# Patient Record
Sex: MALE | Race: WHITE
[De-identification: names, ages, dates, MRNs, and addresses within clinical notes are randomized per-mention and may not be internally consistent; named-entity substitution may affect disease eponyms.]

---

## 2017-09-10 ENCOUNTER — HOSPITAL ENCOUNTER (EMERGENCY)
Dept: HOSPITAL 80 - FED | Age: 27
Discharge: HOME | End: 2017-09-10
Payer: MEDICAID

## 2017-09-10 VITALS — DIASTOLIC BLOOD PRESSURE: 85 MMHG | RESPIRATION RATE: 14 BRPM | SYSTOLIC BLOOD PRESSURE: 134 MMHG | HEART RATE: 45 BPM

## 2017-09-10 VITALS — OXYGEN SATURATION: 96 % | TEMPERATURE: 98.2 F
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
## 2017-09-10 DIAGNOSIS — S62.101A: Primary | ICD-10-CM
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
## 2017-09-10 DIAGNOSIS — F17.200: ICD-10-CM

## 2017-09-10 NOTE — EDPHY
H & P


Time Seen by Provider: 09/10/17 13:28


HPI/ROS: 





CHIEF COMPLAINT:  Right wrist pain





HISTORY OF PRESENT ILLNESS:  27-year-old right-hand-dominant male with prior 

history of right wrist fracture and sustained fall onto his outstretched right 

hand last evening while skateboarding.  No paresthesia.  No proximal distal 

pain or injury.  He has a pre-hospital removable splint in place from 1 his 

wrist was previously broken 1 year ago.











************


PHYSICAL EXAM





(Prior to examination, patient consented to physical exam, hands were washed 

and my usual and customary physical exam procedures followed)


1) GENERAL: Well-developed, well-nourished, alert and oriented.  Appears to be 

in no acute distress.


2) HEAD: Normocephalic


3) HEENT: Pupils equal, round, reactive to light bilaterally.  


4) LUNGS: Breathing comfortably.   


5) MUSCULOSKELETAL:  Tender to palpation distal radius and anatomic snuffbox.  

Soft compartments.  Normal coloration.


6) SKIN: intact 


7) VASCULAR:  pulses and cap refill present are brisk


8) NEUROLOGIC:  Radial, ulnar, median nerve function intact with no deficits 

appreciated on exam 








***************





DIFFERENTIAL DIAGNOSIS:   in no particular order including but not limited to 

fracture, sprain, compartment syndrome





********











Smoking Status: Current every day smoker


Constitutional: 


 Initial Vital Signs











Temperature (C)  36.8 C   09/10/17 12:47


 


Heart Rate  76   09/10/17 12:47


 


Respiratory Rate  16   09/10/17 12:47


 


Blood Pressure  140/79 H  09/10/17 12:47


 


O2 Sat (%)  96   09/10/17 12:47








 











O2 Delivery Mode               Room Air














Allergies/Adverse Reactions: 


 





No Known Allergies Allergy (Unverified 06/06/16 14:11)


 








Home Medications: 














 Medication  Instructions  Recorded


 


Acetaminophen [Tylenol 325mg (*)] 650 mg PO Q4 PRN #0 tab 09/22/16


 


lamoTRIgine [LaMICtal] 25 mg PO HS #0 tab 09/22/16














MDM/Departure





- MDM


Imaging Results: 


 Imaging Impressions





Wrist X-Ray  09/10/17 12:50


Impression:


1. Small chip or avulsion fractures suspected associated with the dorsal aspect 

of the triquetrum.








Images reviewed by myself


Medications Given: 


 








Discontinued Medications





Lorazepam (Ativan)  1 mg PO EDNOW ONE


   Stop: 09/10/17 14:03


   Last Admin: 09/10/17 14:26 Dose:  Not Given


Olanzapine (Zyprexa Zydis)  10 mg PO EDNOW ONE


   Stop: 09/10/17 14:03


   Last Admin: 09/10/17 14:26 Dose:  Not Given





ED Course/Re-evaluation: 





The patient was re-evaluated with serial examinations, discussed his imaging 

results.  He remains neurovascularly intact no evidence of compartment 

syndrome.  He has his own pre-hospital splint from when he broke his same wrist 

1 year ago.  He has place this on his wrist and I have evaluated the placement 

and this is doing an excellent job of immobilizing the wrist.  Recommend he use 

this splint .  Recommend he follow up with Orthopedics and given this follow-up 

information.  Usual and customary orthopedic precautions and instructions 

provided





- Depart


Disposition: Home, Routine, Self-Care


Clinical Impression: 


Right wrist fracture


Qualifiers:


 Encounter type: initial encounter Fracture type: closed Qualified Code(s): 

S62.101A - Fracture of unspecified carpal bone, right wrist, initial encounter 

for closed fracture





Condition: Good


Instructions:  Wrist Fracture in Adults (ED)


Additional Instructions: 


Return to the ER immediately if you experience discoloration, have worsening 

pain, numbness, tingling, or any other symptoms that concern you.  If you 

received x-rays in the emergency department today, be advised, that ligamentous

, tendon, muscular, and other non-bony injury cannot be fully ruled out. Try to 

keep your affected extremity elevated above the level of your chest, and keep 

cold packs on the affected area, for the next 48 hours.





Adult Pain & Fever Control:


We recommend Acetaminophen (Tylenol) and Ibuprofen (Motrin,Advil) for pain and 

fever control.  When fever is high or pain severe, both drugs can be used at 

the same time, but at different intervals.  Please note the time differences.  


Your dose is:     Acetaminophen 650mg every 4 to 6 hours


        Ibuprofen 600mg every 6 hours with food   OR


          


Note:  do not take Acetaminophen with Hydrocodone (Vicodin, Lortab) or Oycodone 

(Percocet).  These medications also contain Acetaminophen.  No more than 3000mg 

of Acetaminophen should be taken in 24 hours (for an adult).


Referrals: 


Aj Adams MD [Medical Doctor] - 2-3 days, call for appt.

## 2018-03-10 ENCOUNTER — HOSPITAL ENCOUNTER (EMERGENCY)
Dept: HOSPITAL 80 - CED | Age: 28
Discharge: HOME | End: 2018-03-10
Payer: MEDICAID

## 2018-03-10 VITALS — RESPIRATION RATE: 16 BRPM | TEMPERATURE: 98.2 F

## 2018-03-10 VITALS — SYSTOLIC BLOOD PRESSURE: 123 MMHG | HEART RATE: 65 BPM | DIASTOLIC BLOOD PRESSURE: 71 MMHG | OXYGEN SATURATION: 96 %

## 2018-03-10 DIAGNOSIS — F17.200: ICD-10-CM
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
## 2018-03-10 DIAGNOSIS — S62.316A: Primary | ICD-10-CM

## 2018-03-10 PROCEDURE — 2W3EX1Z IMMOBILIZATION OF RIGHT HAND USING SPLINT: ICD-10-PCS

## 2018-03-10 NOTE — EDPHY
H & P


Time Seen by Provider: 03/10/18 11:55


HPI/ROS: 





CHIEF COMPLAINT:  Right hand injury





HISTORY OF PRESENT ILLNESS:  Patient complaining of right hand pain and 

swelling after punching a wall last night.  He states he was intoxicated at the 

time.  He admits he has a problem with alcohol.  He denies other injuries.  He 

has no complaints of numbness or weakness to the right hand.  He is right-hand 

dominant.





Social history includes heavy EtOH use, cannabis use.





Medical history includes traumatic brain injury 2 years ago.  Left shoulder 

surgery.





General appearance:  Awake and alert, not intoxicated.  No distress





Head nontraumatic, cervical spine nontender, normal range of motion.


No respiratory distress.


Right upper extremity with tenderness, swelling, erythema to dorsum of hand 

over the 4th and 5th metacarpals.  No wrist tenderness or swelling.  No 

snuffbox tenderness.  Distal circulation, sensation, strength intact.  No 

rotational deformity to the fingers.





DIFFERENTIAL DIAGNOSIS:  After history and physical exam differential diagnosis 

was considered for boxer's fracture, wrist fracture, other hand fracture, 

lunate or perilunate dislocation.  Proximal 5th metacarpal fracture noted on x-

ray I reviewed the images myself.  Discussed briefly with Dr. Schwarz, orthopedist

, plan is for ulnar gutter splint with follow-up next week for more definitive 

casting as indicated.  Discussed with patient.  I reviewed the splint after 

application and good immobilization in ulnar-gutter splint obtained, distal 

intact post splinting.  Patient understands follow-up, stable for discharge.


Smoking Status: Current every day smoker


Constitutional: 


 Initial Vital Signs











Temperature (C)  36.8 C   03/10/18 11:50


 


Heart Rate  59 L  03/10/18 11:50


 


Respiratory Rate  16   03/10/18 11:50


 


Blood Pressure  142/78 H  03/10/18 11:50


 


O2 Sat (%)  94   03/10/18 11:50








 











O2 Delivery Mode               Room Air














Allergies/Adverse Reactions: 


 





No Known Allergies Allergy (Unverified 03/10/18 11:55)


 








Home Medications: 














 Medication  Instructions  Recorded


 


Acetaminophen [Tylenol 325mg (*)] 650 mg PO Q4 PRN #0 tab 09/22/16














Medical Decision Making





- Data Points


Medications Given: 


 








Discontinued Medications





Ibuprofen (Motrin)  600 mg PO EDNOW ONE


   Stop: 03/10/18 12:08


   Last Admin: 03/10/18 12:17 Dose:  600 mg








Departure





- Departure


Disposition: Home, Routine, Self-Care


Clinical Impression: 


Fx metacarpal


Qualifiers:


 Encounter type: initial encounter Metacarpal bone: fifth Fracture type: closed 

Metacarpal location: base Fracture alignment: displaced Laterality: right 

Qualified Code(s): S62.316A - Displaced fracture of base of fifth metacarpal 

bone, right hand, initial encounter for closed fracture





Instructions:  Hand Fracture (ED)


Additional Instructions: 


Keep splint in place.  Call orthopedist office on Monday morning for follow-up 

appointment.  Ice and elevation as well as ibuprofen and Tylenol for pain.  

Return to the ED for re-evaluation if any significant numbness, discoloration 

or other concerning symptoms develop.


Referrals: 


Kenzie Sanchez MD [Medical Doctor] - As per Instructions

## 2018-04-04 ENCOUNTER — HOSPITAL ENCOUNTER (OUTPATIENT)
Dept: HOSPITAL 80 - BRMIMAGING | Age: 28
End: 2018-04-04
Attending: SPECIALIST
Payer: MEDICAID

## 2018-04-04 DIAGNOSIS — S62.316D: Primary | ICD-10-CM
